# Patient Record
Sex: FEMALE | Race: WHITE | NOT HISPANIC OR LATINO | URBAN - METROPOLITAN AREA
[De-identification: names, ages, dates, MRNs, and addresses within clinical notes are randomized per-mention and may not be internally consistent; named-entity substitution may affect disease eponyms.]

---

## 2022-06-01 ENCOUNTER — OFFICE VISIT (OUTPATIENT)
Dept: URBAN - METROPOLITAN AREA CLINIC 54 | Facility: CLINIC | Age: 77
End: 2022-06-01

## 2022-06-29 ENCOUNTER — OFFICE VISIT (OUTPATIENT)
Dept: URBAN - METROPOLITAN AREA CLINIC 54 | Facility: CLINIC | Age: 77
End: 2022-06-29

## 2022-06-29 RX ORDER — METOPROLOL SUCCINATE 50 MG/1
TAKE 1 TABLET (50 MG) BY ORAL ROUTE ONCE DAILY TABLET, EXTENDED RELEASE ORAL 1
Qty: 0 | Refills: 0 | Status: ACTIVE | COMMUNITY
Start: 1900-01-01

## 2022-06-29 RX ORDER — LISINOPRIL 10 MG/1
TABLET ORAL
Qty: 0 | Refills: 0 | Status: ACTIVE | COMMUNITY
Start: 1900-01-01

## 2022-06-29 RX ORDER — ATORVASTATIN CALCIUM 40 MG/1
TAKE 1 TABLET (40 MG) BY ORAL ROUTE ONCE DAILY TABLET, FILM COATED ORAL 1
Qty: 0 | Refills: 0 | Status: ACTIVE | COMMUNITY
Start: 1900-01-01

## 2022-06-29 RX ORDER — PREDNISOLONE ORAL 15 MG/5ML
TAKE 5 MILLILITERS (15 MG) BY ORAL ROUTE ONCE DAILY WITH FOOD SOLUTION ORAL 1
Qty: 0 | Refills: 0 | Status: ACTIVE | COMMUNITY
Start: 1900-01-01

## 2022-06-29 RX ORDER — TRAMADOL HYDROCHLORIDE 50 MG/1
2 TABLETS DAILY TABLET ORAL
Qty: 0 | Refills: 0 | Status: ACTIVE | COMMUNITY
Start: 1900-01-01

## 2022-06-29 RX ORDER — ASPIRIN 81 MG/1
TAKE 1 TABLET (81 MG) BY ORAL ROUTE ONCE DAILY TABLET, COATED ORAL 1
Qty: 0 | Refills: 0 | Status: ACTIVE | COMMUNITY
Start: 1900-01-01

## 2022-06-29 RX ORDER — DIPHENHYDRAMINE HCL 2 %
CREAM (GRAM) TOPICAL
Qty: 0 | Refills: 0 | Status: ACTIVE | COMMUNITY
Start: 1900-01-01

## 2022-06-29 RX ORDER — PANTOPRAZOLE SODIUM 40 MG/1
TAKE 1 TABLET (40 MG) BY ORAL ROUTE ONCE DAILY TABLET, DELAYED RELEASE ORAL 1
Qty: 0 | Refills: 0 | Status: ACTIVE | COMMUNITY
Start: 1900-01-01

## 2022-08-24 ENCOUNTER — OFFICE VISIT (OUTPATIENT)
Dept: URBAN - METROPOLITAN AREA CLINIC 54 | Facility: CLINIC | Age: 77
End: 2022-08-24
Payer: COMMERCIAL

## 2022-08-24 ENCOUNTER — WEB ENCOUNTER (OUTPATIENT)
Dept: URBAN - METROPOLITAN AREA CLINIC 54 | Facility: CLINIC | Age: 77
End: 2022-08-24

## 2022-08-24 VITALS
HEIGHT: 62 IN | TEMPERATURE: 97.7 F | BODY MASS INDEX: 26.31 KG/M2 | HEART RATE: 87 BPM | WEIGHT: 143 LBS | DIASTOLIC BLOOD PRESSURE: 70 MMHG | SYSTOLIC BLOOD PRESSURE: 117 MMHG

## 2022-08-24 DIAGNOSIS — I85.00 ESOPHAGEAL VARICES DETERMINED BY ENDOSCOPY: ICD-10-CM

## 2022-08-24 PROCEDURE — 99213 OFFICE O/P EST LOW 20 MIN: CPT | Performed by: INTERNAL MEDICINE

## 2022-08-24 RX ORDER — ASPIRIN 81 MG/1
TAKE 1 TABLET (81 MG) BY ORAL ROUTE ONCE DAILY TABLET, COATED ORAL 1
Qty: 0 | Refills: 0 | Status: ACTIVE | COMMUNITY
Start: 1900-01-01

## 2022-08-24 RX ORDER — PANTOPRAZOLE SODIUM 40 MG/1
TAKE 1 TABLET (40 MG) BY ORAL ROUTE ONCE DAILY TABLET, DELAYED RELEASE ORAL 1
Qty: 0 | Refills: 0 | Status: ACTIVE | COMMUNITY
Start: 1900-01-01

## 2022-08-24 RX ORDER — LISINOPRIL 10 MG/1
TABLET ORAL
Qty: 0 | Refills: 0 | Status: DISCONTINUED | COMMUNITY
Start: 1900-01-01

## 2022-08-24 RX ORDER — METOPROLOL SUCCINATE 50 MG/1
TAKE 1 TABLET (50 MG) BY ORAL ROUTE ONCE DAILY TABLET, EXTENDED RELEASE ORAL 1
Qty: 0 | Refills: 0 | Status: ACTIVE | COMMUNITY
Start: 1900-01-01

## 2022-08-24 RX ORDER — ATORVASTATIN CALCIUM 40 MG/1
TAKE 1 TABLET (40 MG) BY ORAL ROUTE ONCE DAILY TABLET, FILM COATED ORAL 1
Qty: 0 | Refills: 0 | Status: ACTIVE | COMMUNITY
Start: 1900-01-01

## 2022-08-24 RX ORDER — TRAMADOL HYDROCHLORIDE 50 MG/1
2 TABLETS DAILY TABLET ORAL
Qty: 0 | Refills: 0 | Status: ACTIVE | COMMUNITY
Start: 1900-01-01

## 2022-08-24 RX ORDER — PREDNISOLONE ORAL 15 MG/5ML
TAKE 5 MILLILITERS (15 MG) BY ORAL ROUTE ONCE DAILY WITH FOOD SOLUTION ORAL 1
Qty: 0 | Refills: 0 | Status: ACTIVE | COMMUNITY
Start: 1900-01-01

## 2022-08-24 RX ORDER — DIPHENHYDRAMINE HCL 2 %
CREAM (GRAM) TOPICAL
Qty: 0 | Refills: 0 | Status: ACTIVE | COMMUNITY
Start: 1900-01-01

## 2022-08-24 NOTE — HPI-TODAY'S VISIT:
She is of moderate esophageal varices banded 2019.  Imaging study revealed no portal thrombus or hepatic vein thrombosis and normal hepatopetal flow in the portal system evaluation for treatable forms of liver disease was normal.  Recent complete metabolic profile and CBC were normal.  No history of alcohol use or other chronic liver diseases.  Prior liver biopsy complicated by bleeding.  No edema or ascites no encephalopathy.  History of vascular surgery with carotid endarterectomy history of previous MI she is cardiology and vascular surgery regularly.  The carotid surgery was done earlier this year.  Also within the past 2 years she has had knee replacement and hip replacement.  And

## 2022-09-26 ENCOUNTER — TELEPHONE ENCOUNTER (OUTPATIENT)
Dept: URBAN - METROPOLITAN AREA CLINIC 92 | Facility: CLINIC | Age: 77
End: 2022-09-26

## 2022-11-02 ENCOUNTER — OFFICE VISIT (OUTPATIENT)
Dept: URBAN - METROPOLITAN AREA CLINIC 54 | Facility: CLINIC | Age: 77
End: 2022-11-02

## 2023-01-31 ENCOUNTER — TELEPHONE ENCOUNTER (OUTPATIENT)
Dept: URBAN - METROPOLITAN AREA CLINIC 54 | Facility: CLINIC | Age: 78
End: 2023-01-31

## 2023-02-22 ENCOUNTER — OFFICE VISIT (OUTPATIENT)
Dept: URBAN - METROPOLITAN AREA CLINIC 54 | Facility: CLINIC | Age: 78
End: 2023-02-22
Payer: COMMERCIAL

## 2023-02-22 VITALS
DIASTOLIC BLOOD PRESSURE: 79 MMHG | HEART RATE: 81 BPM | HEIGHT: 62 IN | BODY MASS INDEX: 26.68 KG/M2 | WEIGHT: 145 LBS | TEMPERATURE: 97.4 F | SYSTOLIC BLOOD PRESSURE: 135 MMHG

## 2023-02-22 DIAGNOSIS — I85.00 ESOPHAGEAL VARICES DETERMINED BY ENDOSCOPY: ICD-10-CM

## 2023-02-22 PROCEDURE — 99213 OFFICE O/P EST LOW 20 MIN: CPT | Performed by: INTERNAL MEDICINE

## 2023-02-22 RX ORDER — PREDNISOLONE ORAL 15 MG/5ML
TAKE 5 MILLILITERS (15 MG) BY ORAL ROUTE ONCE DAILY WITH FOOD SOLUTION ORAL 1
Qty: 0 | Refills: 0 | Status: ACTIVE | COMMUNITY
Start: 1900-01-01

## 2023-02-22 RX ORDER — ATORVASTATIN CALCIUM 40 MG/1
TAKE 1 TABLET (40 MG) BY ORAL ROUTE ONCE DAILY TABLET, FILM COATED ORAL 1
Qty: 0 | Refills: 0 | Status: ACTIVE | COMMUNITY
Start: 1900-01-01

## 2023-02-22 RX ORDER — TRAMADOL HYDROCHLORIDE 50 MG/1
2 TABLETS DAILY TABLET ORAL
Qty: 0 | Refills: 0 | Status: ACTIVE | COMMUNITY
Start: 1900-01-01

## 2023-02-22 RX ORDER — PANTOPRAZOLE SODIUM 40 MG/1
TAKE 1 TABLET (40 MG) BY ORAL ROUTE ONCE DAILY TABLET, DELAYED RELEASE ORAL 1
Qty: 0 | Refills: 0 | Status: ACTIVE | COMMUNITY
Start: 1900-01-01

## 2023-02-22 RX ORDER — ASPIRIN 81 MG/1
TAKE 1 TABLET (81 MG) BY ORAL ROUTE ONCE DAILY TABLET, COATED ORAL 1
Qty: 0 | Refills: 0 | Status: ACTIVE | COMMUNITY
Start: 1900-01-01

## 2023-02-22 RX ORDER — METOPROLOL SUCCINATE 50 MG/1
TAKE 1 TABLET (50 MG) BY ORAL ROUTE ONCE DAILY TABLET, EXTENDED RELEASE ORAL 1
Qty: 0 | Refills: 0 | Status: ACTIVE | COMMUNITY
Start: 1900-01-01

## 2023-02-22 RX ORDER — DIPHENHYDRAMINE HCL 2 %
CREAM (GRAM) TOPICAL
Qty: 0 | Refills: 0 | Status: ACTIVE | COMMUNITY
Start: 1900-01-01

## 2023-02-22 NOTE — HPI-TODAY'S VISIT:
Needs follow-up upper endoscopy to follow esophageal varices.  Last endoscopic exam 2019 with banding.  Declines any future colonoscopies.  No bleeding.  No ascites or encephalopathy and no edema.  Liver biopsy in the past complicated by hemothorax.  The liver biopsy did not confirm cirrhosis although it is suspected on imaging.  No recent other labs.  Weight has been stable.  She had carotid surgery which delayed her last upper endoscopy.  She is on chronic Plavix therapy.  No history of chest pain or CHF.

## 2023-02-23 PROBLEM — 28670008: Status: ACTIVE | Noted: 2022-08-24

## 2023-02-23 LAB
AFP, SERUM, TUMOR MARKER: 6.1
ALBUMIN/GLOBULIN RATIO: 1.3
ALBUMIN: 4.2
ALKALINE PHOSPHATASE: 109
ALT: 17
AST: 31
BILIRUBIN, TOTAL: 0.6
BUN/CREATININE RATIO: (no result)
CALCIUM: 9.4
CARBON DIOXIDE: 23
CHLORIDE: 107
CREATININE: 0.85
EGFR: 71
FIB 4 INDEX: 5.34
FIB 4 INTERPRETATION: (no result)
GLOBULIN: 3.3
GLUCOSE: 89
HEMATOCRIT: 34.3
HEMOGLOBIN: 11.6
MCH: 32
MCHC: 33.8
MCV: 94.8
MPV: 11.8
PLATELET COUNT: 107
PLATELET COUNT: 107
POTASSIUM: 4.6
PROTEIN, TOTAL: 7.5
RDW: 11.7
RED BLOOD CELL COUNT: 3.62
SODIUM: 142
UREA NITROGEN (BUN): 20
WHITE BLOOD CELL COUNT: 4.4

## 2023-03-03 ENCOUNTER — TELEPHONE ENCOUNTER (OUTPATIENT)
Dept: URBAN - METROPOLITAN AREA CLINIC 54 | Facility: CLINIC | Age: 78
End: 2023-03-03

## 2023-03-08 ENCOUNTER — OFFICE VISIT (OUTPATIENT)
Dept: URBAN - METROPOLITAN AREA CLINIC 53 | Facility: CLINIC | Age: 78
End: 2023-03-08

## 2023-03-28 ENCOUNTER — TELEPHONE ENCOUNTER (OUTPATIENT)
Dept: URBAN - METROPOLITAN AREA CLINIC 35 | Facility: CLINIC | Age: 78
End: 2023-03-28

## 2023-03-30 ENCOUNTER — OFFICE VISIT (OUTPATIENT)
Dept: URBAN - METROPOLITAN AREA MEDICAL CENTER 23 | Facility: MEDICAL CENTER | Age: 78
End: 2023-03-30
Payer: COMMERCIAL

## 2023-03-30 DIAGNOSIS — I85.00 ESOPHAGEAL  VARICOSE VEINS: ICD-10-CM

## 2023-03-30 PROCEDURE — 43235 EGD DIAGNOSTIC BRUSH WASH: CPT | Performed by: INTERNAL MEDICINE

## 2023-03-30 RX ORDER — DIPHENHYDRAMINE HCL 2 %
CREAM (GRAM) TOPICAL
Qty: 0 | Refills: 0 | Status: ACTIVE | COMMUNITY
Start: 1900-01-01

## 2023-03-30 RX ORDER — ASPIRIN 81 MG/1
TAKE 1 TABLET (81 MG) BY ORAL ROUTE ONCE DAILY TABLET, COATED ORAL 1
Qty: 0 | Refills: 0 | Status: ACTIVE | COMMUNITY
Start: 1900-01-01

## 2023-03-30 RX ORDER — METOPROLOL SUCCINATE 50 MG/1
TAKE 1 TABLET (50 MG) BY ORAL ROUTE ONCE DAILY TABLET, EXTENDED RELEASE ORAL 1
Qty: 0 | Refills: 0 | Status: ACTIVE | COMMUNITY
Start: 1900-01-01

## 2023-03-30 RX ORDER — PANTOPRAZOLE SODIUM 40 MG/1
TAKE 1 TABLET (40 MG) BY ORAL ROUTE ONCE DAILY TABLET, DELAYED RELEASE ORAL 1
Qty: 0 | Refills: 0 | Status: ACTIVE | COMMUNITY
Start: 1900-01-01

## 2023-03-30 RX ORDER — ATORVASTATIN CALCIUM 40 MG/1
TAKE 1 TABLET (40 MG) BY ORAL ROUTE ONCE DAILY TABLET, FILM COATED ORAL 1
Qty: 0 | Refills: 0 | Status: ACTIVE | COMMUNITY
Start: 1900-01-01

## 2023-03-30 RX ORDER — PREDNISOLONE ORAL 15 MG/5ML
TAKE 5 MILLILITERS (15 MG) BY ORAL ROUTE ONCE DAILY WITH FOOD SOLUTION ORAL 1
Qty: 0 | Refills: 0 | Status: ACTIVE | COMMUNITY
Start: 1900-01-01

## 2023-03-30 RX ORDER — TRAMADOL HYDROCHLORIDE 50 MG/1
2 TABLETS DAILY TABLET ORAL
Qty: 0 | Refills: 0 | Status: ACTIVE | COMMUNITY
Start: 1900-01-01

## 2023-08-14 ENCOUNTER — WEB ENCOUNTER (OUTPATIENT)
Dept: URBAN - METROPOLITAN AREA CLINIC 54 | Facility: CLINIC | Age: 78
End: 2023-08-14

## 2023-08-16 ENCOUNTER — OFFICE VISIT (OUTPATIENT)
Dept: URBAN - METROPOLITAN AREA CLINIC 54 | Facility: CLINIC | Age: 78
End: 2023-08-16
Payer: COMMERCIAL

## 2023-08-16 ENCOUNTER — LAB OUTSIDE AN ENCOUNTER (OUTPATIENT)
Dept: URBAN - METROPOLITAN AREA CLINIC 54 | Facility: CLINIC | Age: 78
End: 2023-08-16

## 2023-08-16 VITALS
SYSTOLIC BLOOD PRESSURE: 127 MMHG | DIASTOLIC BLOOD PRESSURE: 77 MMHG | BODY MASS INDEX: 27.97 KG/M2 | WEIGHT: 152 LBS | TEMPERATURE: 97.5 F | HEIGHT: 62 IN | HEART RATE: 99 BPM

## 2023-08-16 DIAGNOSIS — K74.60 CIRRHOSIS OF LIVER WITHOUT ASCITES, UNSPECIFIED HEPATIC CIRRHOSIS TYPE: ICD-10-CM

## 2023-08-16 DIAGNOSIS — I85.00 ESOPHAGEAL VARICES DETERMINED BY ENDOSCOPY: ICD-10-CM

## 2023-08-16 PROBLEM — 19943007: Status: ACTIVE | Noted: 2023-08-16

## 2023-08-16 PROCEDURE — 99213 OFFICE O/P EST LOW 20 MIN: CPT | Performed by: INTERNAL MEDICINE

## 2023-08-16 RX ORDER — ATORVASTATIN CALCIUM 40 MG/1
TAKE 1 TABLET (40 MG) BY ORAL ROUTE ONCE DAILY TABLET, FILM COATED ORAL 1
Qty: 0 | Refills: 0 | Status: ACTIVE | COMMUNITY
Start: 1900-01-01

## 2023-08-16 RX ORDER — METOPROLOL SUCCINATE 50 MG/1
TAKE 1 TABLET (50 MG) BY ORAL ROUTE ONCE DAILY TABLET, EXTENDED RELEASE ORAL 1
Qty: 0 | Refills: 0 | Status: ACTIVE | COMMUNITY
Start: 1900-01-01

## 2023-08-16 RX ORDER — PANTOPRAZOLE SODIUM 40 MG/1
TAKE 1 TABLET (40 MG) BY ORAL ROUTE ONCE DAILY TABLET, DELAYED RELEASE ORAL 1
Qty: 0 | Refills: 0 | Status: ACTIVE | COMMUNITY
Start: 1900-01-01

## 2023-08-16 RX ORDER — PREDNISOLONE ORAL 15 MG/5ML
TAKE 5 MILLILITERS (15 MG) BY ORAL ROUTE ONCE DAILY WITH FOOD SOLUTION ORAL 1
Qty: 0 | Refills: 0 | Status: ACTIVE | COMMUNITY
Start: 1900-01-01

## 2023-08-16 RX ORDER — TRAMADOL HYDROCHLORIDE 50 MG/1
2 TABLETS DAILY TABLET ORAL
Qty: 0 | Refills: 0 | Status: ACTIVE | COMMUNITY
Start: 1900-01-01

## 2023-08-16 RX ORDER — DIPHENHYDRAMINE HCL 2 %
CREAM (GRAM) TOPICAL
Qty: 0 | Refills: 0 | Status: ACTIVE | COMMUNITY
Start: 1900-01-01

## 2023-08-16 RX ORDER — ASPIRIN 81 MG/1
TAKE 1 TABLET (81 MG) BY ORAL ROUTE ONCE DAILY TABLET, COATED ORAL 1
Qty: 0 | Refills: 0 | Status: ACTIVE | COMMUNITY
Start: 1900-01-01

## 2023-08-16 NOTE — PHYSICAL EXAM CONSTITUTIONAL:
well developed, well nourished , in no acute distress , ambulating with cane , normal communication ability, no confusion, no asterixis

## 2023-08-16 NOTE — PHYSICAL EXAM GASTROINTESTINAL
Abdomen , soft, nontender, nondistended , no guarding or rigidity , no masses palpable , normal bowel sounds , Liver and Spleen,  no hepatosplenomegaly , liver nontender no ascites, no edema

## 2023-08-16 NOTE — HPI-TODAY'S VISIT:
EGD earlier this year with minimal to trace varices.  No encephalopathy, edema, ascites.  Last ultrasound with cirrhotic morphology, no ascites.  Alpha-fetoprotein mildly elevated.  At 6.1.  Prior negative screening for hepatitis, autoimmunity and  hemochromatosis were negative.except for AYDIN 1:160 . endoscopic exam 2019 with banding.  Declines any future colonoscopies.  No bleeding.  No ascites or encephalopathy and no edema.  Liver biopsy in the past complicated by hemothorax.  The liver biopsy did not confirm cirrhosis although it is suspected on imaging.  No recent other labs.  Weight has been stable.  She had carotid surgery which delayed her last upper endoscopy.  She is on chronic Plavix therapy.  No history of chest pain or CHF.

## 2023-08-17 LAB
A/G RATIO: 1.3
AFP, SERUM, TUMOR MARKER: 7.1
ALBUMIN: 4.4
ALKALINE PHOSPHATASE: 111
ALT (SGPT): 17
AST (SGOT): 33
BILIRUBIN, TOTAL: 0.6
BUN/CREATININE RATIO: (no result)
BUN: 23
CALCIUM: 9.7
CARBON DIOXIDE, TOTAL: 22
CHLORIDE: 104
CREATININE: 0.92
EGFR: 65
GLOBULIN, TOTAL: 3.4
GLUCOSE: 91
HEMATOCRIT: 35.1
HEMOGLOBIN: 11.4
INR: 1.1
MCH: 29.6
MCHC: 32.5
MCV: 91.2
MPV: 11.2
PLATELET COUNT: 145
POTASSIUM: 4.8
PROTEIN, TOTAL: 7.8
PT: 11.6
RDW: 12.8
RED BLOOD CELL COUNT: 3.85
SODIUM: 139
WHITE BLOOD CELL COUNT: 5.7

## 2023-09-20 ENCOUNTER — OFFICE VISIT (OUTPATIENT)
Dept: URBAN - METROPOLITAN AREA CLINIC 53 | Facility: CLINIC | Age: 78
End: 2023-09-20
Payer: COMMERCIAL

## 2023-09-20 DIAGNOSIS — K74.69 OTHER CIRRHOSIS OF LIVER: ICD-10-CM

## 2023-09-20 PROCEDURE — 76700 US EXAM ABDOM COMPLETE: CPT | Performed by: INTERNAL MEDICINE

## 2024-02-14 ENCOUNTER — OV EP (OUTPATIENT)
Dept: URBAN - NONMETROPOLITAN AREA CLINIC 4 | Facility: CLINIC | Age: 79
End: 2024-02-14

## 2024-03-13 ENCOUNTER — OV EP (OUTPATIENT)
Dept: URBAN - NONMETROPOLITAN AREA CLINIC 4 | Facility: CLINIC | Age: 79
End: 2024-03-13
Payer: COMMERCIAL

## 2024-03-13 VITALS
WEIGHT: 143.2 LBS | DIASTOLIC BLOOD PRESSURE: 93 MMHG | SYSTOLIC BLOOD PRESSURE: 169 MMHG | BODY MASS INDEX: 26.35 KG/M2 | HEART RATE: 66 BPM | HEIGHT: 62 IN | TEMPERATURE: 98.1 F

## 2024-03-13 DIAGNOSIS — K74.60 CIRRHOSIS OF LIVER WITHOUT ASCITES, UNSPECIFIED HEPATIC CIRRHOSIS TYPE: ICD-10-CM

## 2024-03-13 DIAGNOSIS — I85.00 ESOPHAGEAL VARICES DETERMINED BY ENDOSCOPY: ICD-10-CM

## 2024-03-13 PROCEDURE — 99214 OFFICE O/P EST MOD 30 MIN: CPT | Performed by: PHYSICIAN ASSISTANT

## 2024-03-13 RX ORDER — DIPHENHYDRAMINE HCL 2 %
CREAM (GRAM) TOPICAL
Qty: 0 | Refills: 0 | Status: ACTIVE | COMMUNITY
Start: 1900-01-01

## 2024-03-13 RX ORDER — CLOPIDOGREL 75 MG/1
1 TABLET TABLET, FILM COATED ORAL ONCE A DAY
Status: ACTIVE | COMMUNITY

## 2024-03-13 RX ORDER — PREDNISOLONE ORAL 15 MG/5ML
TAKE 5 MILLILITERS (15 MG) BY ORAL ROUTE ONCE DAILY WITH FOOD SOLUTION ORAL 1
Qty: 0 | Refills: 0 | Status: ACTIVE | COMMUNITY
Start: 1900-01-01

## 2024-03-13 RX ORDER — METOPROLOL SUCCINATE 50 MG/1
TAKE 1 TABLET (50 MG) BY ORAL ROUTE ONCE DAILY TABLET, EXTENDED RELEASE ORAL 1
Qty: 0 | Refills: 0 | Status: ACTIVE | COMMUNITY
Start: 1900-01-01

## 2024-03-13 RX ORDER — LOSARTAN POTASSIUM 25 MG/1
1 TABLET TABLET, FILM COATED ORAL ONCE A DAY
Status: ACTIVE | COMMUNITY

## 2024-03-13 RX ORDER — PANTOPRAZOLE SODIUM 40 MG/1
TAKE 1 TABLET (40 MG) BY ORAL ROUTE ONCE DAILY TABLET, DELAYED RELEASE ORAL 1
Qty: 0 | Refills: 0 | Status: ACTIVE | COMMUNITY
Start: 1900-01-01

## 2024-03-13 RX ORDER — TRAMADOL HYDROCHLORIDE 50 MG/1
2 TABLETS DAILY TABLET ORAL
Qty: 0 | Refills: 0 | Status: ACTIVE | COMMUNITY
Start: 1900-01-01

## 2024-03-13 RX ORDER — ATORVASTATIN CALCIUM 40 MG/1
TAKE 1 TABLET (40 MG) BY ORAL ROUTE ONCE DAILY TABLET, FILM COATED ORAL 1
Qty: 0 | Refills: 0 | Status: ACTIVE | COMMUNITY
Start: 1900-01-01

## 2024-03-13 RX ORDER — IBUPROFEN 200 MG/1
1 CAPSULE WITH FOOD OR MILK AS NEEDED CAPSULE, LIQUID FILLED ORAL THREE TIMES A DAY
Refills: 0 | Status: ACTIVE | COMMUNITY
Start: 1900-01-01

## 2024-03-13 RX ORDER — ASPIRIN 81 MG/1
TAKE 1 TABLET (81 MG) BY ORAL ROUTE ONCE DAILY TABLET, COATED ORAL 1
Qty: 0 | Refills: 0 | Status: ACTIVE | COMMUNITY
Start: 1900-01-01

## 2024-03-13 NOTE — HPI-TODAY'S VISIT:
EGD earlier this year with minimal to trace varices.  No encephalopathy, edema, ascites.  Last ultrasound with cirrhotic morphology, no ascites.  Alpha-fetoprotein mildly elevated.  At 6.1.  Prior negative screening for hepatitis, autoimmunity and  hemochromatosis were negative.except for AYDIN 1:160 . endoscopic exam 2019 with banding.  Declines any future colonoscopies.  No bleeding.  No ascites or encephalopathy and no edema.  Liver biopsy in the past complicated by hemothorax.  The liver biopsy did not confirm cirrhosis although it is suspected on imaging.  No recent other labs.  Weight has been stable.  She had carotid surgery which delayed her last upper endoscopy.  She is on chronic Plavix therapy.  No history of chest pain or CHF.  3.13.24 Doing well no LE edema, no signs of decompesated disease  last AFP 7.1, with imaging 9/2023 with coarse echotexture of liver, consistent with possible cirrhosis EGD prev with small EVs  no abd distention, no nausea musculoskeletal issues with right knee, right shoulder

## 2024-03-13 NOTE — PHYSICAL EXAM HENT:
Head, normocephalic, atraumatic, Face, Face within normal limits, Lips with  Sjogren's features  Ears, External ears within normal limits

## 2024-03-13 NOTE — PHYSICAL EXAM CONSTITUTIONAL:
well developed, well nourished , in no acute distress , ambulating with cane, unchanged , normal communication ability, no confusion, no asterixis

## 2024-03-13 NOTE — PHYSICAL EXAM EYES:
Conjuntivae and eyelids appear dry, and wither scleroderma/ Sjogren's features Sclerae : White without injection No

## 2024-03-19 LAB
AFP, SERUM: 1.7
AFP-L3%, SERUM: (no result)
ANA SCREEN, IFA: POSITIVE
HEPATITIS B CORE AB TOTAL: (no result)
HEPATITIS B SURFACE ANTIBODY QL: (no result)
INR: 1.1
INTERPRETATION: (no result)
PT: 11.5
RHEUMATOID FACTOR: <14
SJOGREN'S ANTIBODY (SS-A): (no result)
SJOGREN'S ANTIBODY (SS-B): (no result)

## 2024-09-11 ENCOUNTER — OFFICE VISIT (OUTPATIENT)
Dept: URBAN - NONMETROPOLITAN AREA CLINIC 4 | Facility: CLINIC | Age: 79
End: 2024-09-11